# Patient Record
Sex: MALE | Race: WHITE | NOT HISPANIC OR LATINO | Employment: FULL TIME | URBAN - METROPOLITAN AREA
[De-identification: names, ages, dates, MRNs, and addresses within clinical notes are randomized per-mention and may not be internally consistent; named-entity substitution may affect disease eponyms.]

---

## 2020-10-22 ENCOUNTER — OFFICE VISIT (OUTPATIENT)
Dept: URGENT CARE | Facility: PHYSICIAN GROUP | Age: 55
End: 2020-10-22
Payer: OTHER GOVERNMENT

## 2020-10-22 ENCOUNTER — HOSPITAL ENCOUNTER (OUTPATIENT)
Dept: RADIOLOGY | Facility: MEDICAL CENTER | Age: 55
End: 2020-10-22
Attending: PHYSICIAN ASSISTANT
Payer: OTHER GOVERNMENT

## 2020-10-22 VITALS
DIASTOLIC BLOOD PRESSURE: 80 MMHG | TEMPERATURE: 97.2 F | RESPIRATION RATE: 20 BRPM | BODY MASS INDEX: 28.77 KG/M2 | HEIGHT: 70 IN | SYSTOLIC BLOOD PRESSURE: 124 MMHG | WEIGHT: 201 LBS | HEART RATE: 92 BPM | OXYGEN SATURATION: 97 %

## 2020-10-22 DIAGNOSIS — S20.212A CONTUSION OF RIB ON LEFT SIDE, INITIAL ENCOUNTER: ICD-10-CM

## 2020-10-22 PROCEDURE — 99204 OFFICE O/P NEW MOD 45 MIN: CPT | Performed by: PHYSICIAN ASSISTANT

## 2020-10-22 PROCEDURE — 71111 X-RAY EXAM RIBS/CHEST4/> VWS: CPT

## 2020-10-22 RX ORDER — TRAMADOL HYDROCHLORIDE 50 MG/1
50 TABLET ORAL EVERY 4 HOURS PRN
Qty: 15 TAB | Refills: 0 | Status: SHIPPED | OUTPATIENT
Start: 2020-10-22 | End: 2020-10-27

## 2020-10-22 ASSESSMENT — ENCOUNTER SYMPTOMS
SINUS PAIN: 0
NAUSEA: 0
WHEEZING: 0
HEADACHES: 0
SHORTNESS OF BREATH: 0
SORE THROAT: 0
DIZZINESS: 0
DIAPHORESIS: 0
ORTHOPNEA: 0
TINGLING: 0
VOMITING: 0
DIARRHEA: 0
CHILLS: 0
FEVER: 0
MYALGIAS: 0
ABDOMINAL PAIN: 0
COUGH: 0
PALPITATIONS: 0
BLURRED VISION: 0
SENSORY CHANGE: 0
WEIGHT LOSS: 0
ROS SKIN COMMENTS: NO ECCHYMOSIS.

## 2020-10-22 ASSESSMENT — PAIN SCALES - GENERAL: PAINLEVEL: 7=MODERATE-SEVERE PAIN

## 2020-10-22 NOTE — PROGRESS NOTES
Subjective:   Byron Patel is a 55 y.o. male who presents for Rib Pain (rib injury, L back ribs, x3 days ago)      HPI:  This is a very pleasant 55-year-old male presented to the clinic with left-sided rib pain x3 days.  Patient states he was taking down a retaining wall when part of it fell on him.  He noticed immediate pain to the left posterior ribs.  Pain is made worse with any palpation.  Pain is also aggravated with deep breathing.  He finds it hard to find a comfortable position to sleep.  Patient recently had open heart surgery at the end of August for a double bypass.  He has been recovering very well.  He wants to make sure he does not have any disruption to the wiring after surgery.  He denies any shortness of breath or chest pain.  He has not been coughing he does not have increased sputum production.  Denies any hemoptysis.  He is currently in moderate pain he finds minimal to no relief with Tylenol.    Review of Systems   Constitutional: Negative for chills, diaphoresis, fever, malaise/fatigue and weight loss.   HENT: Negative for congestion, sinus pain and sore throat.    Eyes: Negative for blurred vision.   Respiratory: Negative for cough, shortness of breath and wheezing.    Cardiovascular: Negative for chest pain, palpitations, orthopnea and leg swelling.   Gastrointestinal: Negative for abdominal pain, diarrhea, nausea and vomiting.   Musculoskeletal: Negative for myalgias.        Left-sided posterior rib pain.   Skin: Negative for rash.        No ecchymosis.   Neurological: Negative for dizziness, tingling, sensory change and headaches.       Medications:    • This patient does not have an active medication from one of the medication groupers.    Allergies: Patient has no known allergies.    Problem List: Byron Patel does not have a problem list on file.    Surgical History:  No past surgical history on file.    Past Social Hx: Byron Patel  reports that he has  "never smoked. He has never used smokeless tobacco.     Past Family Hx:  Byron Patel family history is not on file.     Problem list, medications, and allergies reviewed by myself today in Epic.     Objective:     /80   Pulse 92   Temp 36.2 °C (97.2 °F) (Temporal)   Resp 20   Ht 1.778 m (5' 10\")   Wt 91.2 kg (201 lb)   SpO2 97%   BMI 28.84 kg/m²     Physical Exam  Constitutional:       General: He is not in acute distress.     Appearance: Normal appearance. He is not ill-appearing, toxic-appearing or diaphoretic.   HENT:      Head: Normocephalic and atraumatic.      Mouth/Throat:      Mouth: Mucous membranes are moist.      Pharynx: No oropharyngeal exudate or posterior oropharyngeal erythema.   Eyes:      Conjunctiva/sclera: Conjunctivae normal.   Neck:      Musculoskeletal: Normal range of motion. No muscular tenderness.   Cardiovascular:      Rate and Rhythm: Normal rate and regular rhythm.      Pulses: Normal pulses.      Heart sounds: Normal heart sounds.      Comments: Scar from previous surgery.  Incisions are well-healed no signs of infection.  Pulmonary:      Effort: Pulmonary effort is normal.      Breath sounds: Normal breath sounds. No wheezing, rhonchi or rales.      Comments: Tenderness to the left side posterior ribs.  No overlying ecchymosis.  No crepitus.  No obvious deformities or step-offs.  Abdominal:      Palpations: Abdomen is soft.      Tenderness: There is no abdominal tenderness.   Lymphadenopathy:      Cervical: No cervical adenopathy.   Skin:     General: Skin is warm and dry.      Capillary Refill: Capillary refill takes less than 2 seconds.   Neurological:      General: No focal deficit present.      Mental Status: He is alert and oriented to person, place, and time. Mental status is at baseline.   Psychiatric:         Mood and Affect: Mood normal.         Thought Content: Thought content normal.       X-ray rib series and chest x-ray:  FINDINGS:  No fractures or " acute bony changes are noted.  There is no evidence of a hemo or pneumothorax.     IMPRESSION:  Normal rib series.    Assessment/Plan:     Diagnosis and associated orders:     1. Contusion of rib on left side, initial encounter  [id number] (WITH 1-VIEW CXR)      Comments/MDM:       • Patient sustained rib contusion to his left posterior ribs.  No fractures were found on x-ray.  No signs of hemothorax or pneumothorax.  Normal lung sounds on exam.  Patient had a previous open heart surgery 2 months ago.  I encouraged deep breathing to prevent pneumonia.  • [facility] Aware web site evaluation: I have obtained and reviewed patient utilization report from [facility] pharmacy database prior to writing prescription for controlled substance II, III or IV per [place] bill . Based on the report and my clinical assessment the prescription is medically necessary.   • May use Tylenol as needed for pain.  Tramadol as needed in the evening to help sleep.             Differential diagnosis, natural history, supportive care, and indications for immediate follow-up discussed.    Advised the patient to follow-up with the primary care physician for recheck, reevaluation, and consideration of further management.    Please note that this dictation was created using voice recognition software. I have made reasonable attempt to correct obvious errors, but I expect that there are errors of grammar and possibly content that I did not discover before finalizing the note.    This note was electronically signed by SARA Turner PA-C